# Patient Record
Sex: MALE | Race: WHITE
[De-identification: names, ages, dates, MRNs, and addresses within clinical notes are randomized per-mention and may not be internally consistent; named-entity substitution may affect disease eponyms.]

---

## 2018-07-20 ENCOUNTER — HOSPITAL ENCOUNTER (EMERGENCY)
Dept: HOSPITAL 74 - JER | Age: 28
Discharge: HOME | End: 2018-07-20
Payer: COMMERCIAL

## 2018-07-20 VITALS — BODY MASS INDEX: 27.1 KG/M2

## 2018-07-20 VITALS — TEMPERATURE: 97.6 F | SYSTOLIC BLOOD PRESSURE: 141 MMHG | DIASTOLIC BLOOD PRESSURE: 89 MMHG | HEART RATE: 87 BPM

## 2018-07-20 DIAGNOSIS — Z77.21: Primary | ICD-10-CM
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
## 2019-11-01 ENCOUNTER — HOSPITAL ENCOUNTER (EMERGENCY)
Dept: HOSPITAL 74 - JER | Age: 29
Discharge: HOME | End: 2019-11-01
Payer: COMMERCIAL

## 2019-11-01 VITALS — BODY MASS INDEX: 29.9 KG/M2

## 2019-11-01 VITALS — HEART RATE: 82 BPM | SYSTOLIC BLOOD PRESSURE: 146 MMHG | DIASTOLIC BLOOD PRESSURE: 96 MMHG | TEMPERATURE: 98.3 F
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
## 2019-11-01 DIAGNOSIS — Z77.21: Primary | ICD-10-CM

## 2019-11-01 LAB
ALBUMIN SERPL-MCNC: 4.1 G/DL (ref 3.4–5)
ALP SERPL-CCNC: 95 U/L (ref 45–117)
ALT SERPL-CCNC: 30 U/L (ref 13–61)
ANION GAP SERPL CALC-SCNC: 8 MMOL/L (ref 8–16)
AST SERPL-CCNC: 17 U/L (ref 15–37)
BASOPHILS # BLD: 1.1 % (ref 0–2)
BILIRUB DIRECT SERPL-MCNC: 228 U/L (ref 87–246)
BILIRUB SERPL-MCNC: 0.5 MG/DL (ref 0.2–1)
BUN SERPL-MCNC: 21.9 MG/DL (ref 7–18)
CALCIUM SERPL-MCNC: 8.7 MG/DL (ref 8.5–10.1)
CHLORIDE SERPL-SCNC: 106 MMOL/L (ref 98–107)
CHOLEST SERPL-MCNC: 236 MG/DL (ref 50–200)
CO2 SERPL-SCNC: 26 MMOL/L (ref 21–32)
CREAT SERPL-MCNC: 1.1 MG/DL (ref 0.55–1.3)
DEPRECATED RDW RBC AUTO: 12.8 % (ref 11.9–15.9)
EOSINOPHIL # BLD: 0.8 % (ref 0–4.5)
GGT SERPL-CCNC: 23 U/L (ref 5–85)
GLUCOSE SERPL-MCNC: 96 MG/DL (ref 74–106)
HCT VFR BLD CALC: 42.6 % (ref 35.4–49)
HGB BLD-MCNC: 15.3 GM/DL (ref 11.7–16.9)
LYMPHOCYTES # BLD: 45.9 % (ref 8–40)
MCH RBC QN AUTO: 32.8 PG (ref 25.7–33.7)
MCHC RBC AUTO-ENTMCNC: 35.9 G/DL (ref 32–35.9)
MCV RBC: 91.4 FL (ref 80–96)
MONOCYTES # BLD AUTO: 9.4 % (ref 3.8–10.2)
NEUTROPHILS # BLD: 42.8 % (ref 42.8–82.8)
PHOSPHATE SERPL-MCNC: 4.2 MG/DL (ref 2.5–4.9)
PLATELET # BLD AUTO: 239 K/MM3 (ref 134–434)
PMV BLD: 9 FL (ref 7.5–11.1)
POTASSIUM SERPLBLD-SCNC: 4 MMOL/L (ref 3.5–5.1)
PROT SERPL-MCNC: 7.3 G/DL (ref 6.4–8.2)
RBC # BLD AUTO: 4.66 M/MM3 (ref 4–5.6)
SODIUM SERPL-SCNC: 141 MMOL/L (ref 136–145)
TRIGL SERPL-MCNC: 168 MG/DL (ref 0–150)
URATE SERPL-SCNC: 5 MG/DL (ref 2.6–7.2)
WBC # BLD AUTO: 7.3 K/MM3 (ref 4–10)

## 2019-11-01 PROCEDURE — 3E0234Z INTRODUCTION OF SERUM, TOXOID AND VACCINE INTO MUSCLE, PERCUTANEOUS APPROACH: ICD-10-PCS
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
## 2022-08-25 ENCOUNTER — HOSPITAL ENCOUNTER (EMERGENCY)
Dept: HOSPITAL 74 - FER | Age: 32
Discharge: HOME | End: 2022-08-25
Payer: COMMERCIAL

## 2022-08-25 VITALS
TEMPERATURE: 99.1 F | HEART RATE: 76 BPM | RESPIRATION RATE: 18 BRPM | SYSTOLIC BLOOD PRESSURE: 137 MMHG | DIASTOLIC BLOOD PRESSURE: 92 MMHG

## 2022-08-25 VITALS — BODY MASS INDEX: 29.9 KG/M2
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
## 2022-08-25 DIAGNOSIS — M25.561: ICD-10-CM

## 2022-08-25 DIAGNOSIS — M25.512: Primary | ICD-10-CM

## 2022-08-25 DIAGNOSIS — M25.532: ICD-10-CM

## 2022-08-25 DIAGNOSIS — M54.2: ICD-10-CM
